# Patient Record
Sex: MALE | URBAN - METROPOLITAN AREA
[De-identification: names, ages, dates, MRNs, and addresses within clinical notes are randomized per-mention and may not be internally consistent; named-entity substitution may affect disease eponyms.]

---

## 2017-01-23 ENCOUNTER — OFFICE VISIT (OUTPATIENT)
Dept: FAMILY MEDICINE | Facility: CLINIC | Age: 27
End: 2017-01-23
Payer: COMMERCIAL

## 2017-01-23 VITALS
WEIGHT: 162 LBS | SYSTOLIC BLOOD PRESSURE: 120 MMHG | DIASTOLIC BLOOD PRESSURE: 58 MMHG | HEART RATE: 55 BPM | BODY MASS INDEX: 23.19 KG/M2 | TEMPERATURE: 97.2 F | HEIGHT: 70 IN

## 2017-01-23 DIAGNOSIS — Z00.00 ROUTINE GENERAL MEDICAL EXAMINATION AT A HEALTH CARE FACILITY: ICD-10-CM

## 2017-01-23 DIAGNOSIS — L29.9 PRURITIC DISORDER: Primary | ICD-10-CM

## 2017-01-23 DIAGNOSIS — B08.1 MOLLUSCUM CONTAGIOSUM: ICD-10-CM

## 2017-01-23 LAB
ALBUMIN SERPL-MCNC: 4.1 G/DL (ref 3.4–5)
ALP SERPL-CCNC: 70 U/L (ref 40–150)
ALT SERPL W P-5'-P-CCNC: 38 U/L (ref 0–70)
ANION GAP SERPL CALCULATED.3IONS-SCNC: 7 MMOL/L (ref 3–14)
AST SERPL W P-5'-P-CCNC: 27 U/L (ref 0–45)
BASOPHILS # BLD AUTO: 0 10E9/L (ref 0–0.2)
BASOPHILS NFR BLD AUTO: 0.3 %
BILIRUB SERPL-MCNC: 0.7 MG/DL (ref 0.2–1.3)
BUN SERPL-MCNC: 15 MG/DL (ref 7–30)
CALCIUM SERPL-MCNC: 9 MG/DL (ref 8.5–10.1)
CHLORIDE SERPL-SCNC: 103 MMOL/L (ref 94–109)
CO2 SERPL-SCNC: 29 MMOL/L (ref 20–32)
CREAT SERPL-MCNC: 0.8 MG/DL (ref 0.66–1.25)
DIFFERENTIAL METHOD BLD: NORMAL
EOSINOPHIL # BLD AUTO: 0.3 10E9/L (ref 0–0.7)
EOSINOPHIL NFR BLD AUTO: 5.3 %
ERYTHROCYTE [DISTWIDTH] IN BLOOD BY AUTOMATED COUNT: 11.9 % (ref 10–15)
GFR SERPL CREATININE-BSD FRML MDRD: NORMAL ML/MIN/1.7M2
GLUCOSE SERPL-MCNC: 88 MG/DL (ref 70–99)
HCT VFR BLD AUTO: 44.2 % (ref 40–53)
HGB BLD-MCNC: 15.2 G/DL (ref 13.3–17.7)
LYMPHOCYTES # BLD AUTO: 2.6 10E9/L (ref 0.8–5.3)
LYMPHOCYTES NFR BLD AUTO: 40.3 %
MCH RBC QN AUTO: 28.7 PG (ref 26.5–33)
MCHC RBC AUTO-ENTMCNC: 34.4 G/DL (ref 31.5–36.5)
MCV RBC AUTO: 83 FL (ref 78–100)
MONOCYTES # BLD AUTO: 0.5 10E9/L (ref 0–1.3)
MONOCYTES NFR BLD AUTO: 7.5 %
NEUTROPHILS # BLD AUTO: 3 10E9/L (ref 1.6–8.3)
NEUTROPHILS NFR BLD AUTO: 46.6 %
PLATELET # BLD AUTO: 169 10E9/L (ref 150–450)
POTASSIUM SERPL-SCNC: 4 MMOL/L (ref 3.4–5.3)
PROT SERPL-MCNC: 7.1 G/DL (ref 6.8–8.8)
RBC # BLD AUTO: 5.3 10E12/L (ref 4.4–5.9)
SODIUM SERPL-SCNC: 139 MMOL/L (ref 133–144)
TSH SERPL DL<=0.005 MIU/L-ACNC: 3.78 MU/L (ref 0.4–4)
WBC # BLD AUTO: 6.4 10E9/L (ref 4–11)

## 2017-01-23 PROCEDURE — 99395 PREV VISIT EST AGE 18-39: CPT | Performed by: FAMILY MEDICINE

## 2017-01-23 PROCEDURE — 80050 GENERAL HEALTH PANEL: CPT | Performed by: FAMILY MEDICINE

## 2017-01-23 PROCEDURE — 99212 OFFICE O/P EST SF 10 MIN: CPT | Mod: 25 | Performed by: FAMILY MEDICINE

## 2017-01-23 PROCEDURE — 36415 COLL VENOUS BLD VENIPUNCTURE: CPT | Performed by: FAMILY MEDICINE

## 2017-01-23 RX ORDER — CETIRIZINE HYDROCHLORIDE 5 MG/1
5 TABLET ORAL DAILY
Qty: 90 TABLET | Refills: 3 | Status: SHIPPED | OUTPATIENT
Start: 2017-01-23

## 2017-01-23 NOTE — Clinical Note
[facility]   [address] [place]  [address]  [phone]                                   January 23, 2017    Parth Tijerina  [address] [facility] [address]        Dear Parth,    Your thyroid and comprehensive tests are all normal   Your basic metabolic panel which includes electrolytes,kidney function is normal and  -Glucose (diabetic screening test) is within normal limits    Liver tests are normal     Follow up as needed.  I have made a referral for you to the dermatologist ;  [facility] [place] [phone]   [email or web address]    Results for orders placed or performed in visit on 01/23/17   CBC with platelets differential   Result Value Ref Range    WBC 6.4 4.0 - 11.0 10e9/L    RBC Count 5.30 4.4 - 5.9 10e12/L    Hemoglobin 15.2 13.3 - 17.7 g/dL    Hematocrit 44.2 40.0 - 53.0 %    MCV 83 78 - 100 fl    MCH 28.7 26.5 - 33.0 pg    MCHC 34.4 31.5 - 36.5 g/dL    RDW 11.9 10.0 - 15.0 %    Platelet Count 169 150 - 450 10e9/L    Diff Method Automated Method     % Neutrophils 46.6 %    % Lymphocytes 40.3 %    % Monocytes 7.5 %    % Eosinophils 5.3 %    % Basophils 0.3 %    Absolute Neutrophil 3.0 1.6 - 8.3 10e9/L    Absolute Lymphocytes 2.6 0.8 - 5.3 10e9/L    Absolute Monocytes 0.5 0.0 - 1.3 10e9/L    Absolute Eosinophils 0.3 0.0 - 0.7 10e9/L    Absolute Basophils 0.0 0.0 - 0.2 10e9/L   TSH with free T4 reflex   Result Value Ref Range    TSH 3.78 0.40 - 4.00 mU/L   Comprehensive metabolic panel   Result Value Ref Range    Sodium 139 133 - 144 mmol/L    Potassium 4.0 3.4 - 5.3 mmol/L    Chloride 103 94 - 109 mmol/L    Carbon Dioxide 29 20 - 32 mmol/L    Anion Gap 7 3 - 14 mmol/L    Glucose 88 70 - 99 mg/dL    Urea Nitrogen 15 7 - 30 mg/dL    Creatinine 0.80 0.66 - 1.25 mg/dL    GFR Estimate >90  Non  GFR Calc   >60 mL/min/1.7m2    GFR Estimate If Black >90   GFR Calc   >60 mL/min/1.7m2     Calcium 9.0 8.5 - 10.1 mg/dL    Bilirubin Total 0.7 0.2 - 1.3 mg/dL    Albumin 4.1 3.4 - 5.0 g/dL    Protein Total 7.1 6.8 - 8.8 g/dL    Alkaline Phosphatase 70 40 - 150 U/L    ALT 38 0 - 70 U/L    AST 27 0 - 45 U/L       If you have any questions please call the clinic at [phone]    Sincerely,    Antonino Perez MD  bmd

## 2017-01-23 NOTE — PROGRESS NOTES
SUBJECTIVE:     CC: Parth Tijerina is an 26 year old male who presents for preventative health visit.     Healthy Habits:    Do you get at least three servings of calcium containing foods daily (dairy, green leafy vegetables, etc.)? yes    Amount of exercise or daily activities, outside of work: 5 day(s) per week    Problems taking medications regularly No    Medication side effects: No    Have you had an eye exam in the past two years? yes    Do you see a dentist twice per year? yes    Do you have sleep apnea, excessive snoring or daytime drowsiness?no        Pt states he has allergies does not know the cause, takes an allergy med that he got from a friend that has helped. States if he doesn't take it within 24 hrs he gets itchy everywhere.  He does not have a rash   162 lbs 0 oz   Weight has been stable   No swollen glands   He thinks it is related to diet     Today's PHQ-2 Score:   PHQ-2 ( 1999 [employer]) 6/9/2015 8/20/2013   Q1: Little interest or pleasure in doing things 0 0   Q2: Feeling down, depressed or hopeless 0 0   PHQ-2 Score 0 0       Abuse: Current or Past(Physical, Sexual or Emotional)- No  Do you feel safe in your environment - Yes    Social History   Substance Use Topics     Smoking status: Never Smoker      Smokeless tobacco: Never Used     Alcohol Use: Yes     The patient does not drink >3 drinks per day nor >7 drinks per week.    Last PSA: No results found for: PSA    Recent Labs   Lab Test  06/30/15   1325   CHOL  146   HDL  46   LDL  91   TRIG  47   CHOLHDLRATIO  3.2       Reviewed orders with patient. Reviewed health maintenance and updated orders accordingly - Yes    All Histories reviewed and updated in Epic.  History reviewed. No pertinent past medical history.   History reviewed. No pertinent past surgical history.    ROS:  C: NEGATIVE for fever, chills, change in weight  I: NEGATIVE for worrisome rashes, moles or lesions  E: NEGATIVE for vision changes or irritation  ENT: NEGATIVE for  ear, mouth and throat problems  R: NEGATIVE for significant cough or SOB  CV: NEGATIVE for chest pain, palpitations or peripheral edema  GI: NEGATIVE for nausea, abdominal pain, heartburn, or change in bowel habits   male: negative for dysuria, hematuria, decreased urinary stream, erectile dysfunction, urethral discharge  M: NEGATIVE for significant arthralgias or myalgia  N: NEGATIVE for weakness, dizziness or paresthesias  P: NEGATIVE for changes in mood or affect    Patient has chronic pruritus   He states it gets worse in the winter   Present for the last 3-4 years   Patient has a negative hiv done through an std screening clinic   He had one episode of a rash that occurred when he came in contact with his dog   He showed me a picture and that appeared to be hives     Problem list, Medication list, Allergies, and Medical/Social/Surgical histories reviewed in [facility] and updated as appropriate.  Labs reviewed in EPIC  BP Readings from Last 3 Encounters:   01/23/17 120/58   06/30/15 115/71   06/09/15 112/63    Wt Readings from Last 3 Encounters:   01/23/17 162 lb (73.483 kg)   06/30/15 158 lb (71.668 kg)   06/09/15 161 lb (73.029 kg)                  Patient Active Problem List   Diagnosis     Vitamin D deficiency     Warts     TB lung, latent     CARDIOVASCULAR SCREENING; LDL GOAL LESS THAN 160     Mechanical low back pain     Low back pain     History reviewed. No pertinent past surgical history.    Social History   Substance Use Topics     Smoking status: Never Smoker      Smokeless tobacco: Never Used     Alcohol Use: Yes     Family History   Problem Relation Age of Onset     HEART DISEASE Mother      Breast Cancer Maternal Grandmother      CANCER Maternal Grandmother      DIABETES Maternal Grandmother      CEREBROVASCULAR DISEASE Maternal Grandmother      Thyroid Disease Maternal Grandmother      Hypertension Paternal Grandmother      CEREBROVASCULAR DISEASE Paternal Grandmother      Hypertension Paternal  "Grandfather      Glaucoma No family hx of      Macular Degeneration No family hx of            Allergies   Allergen Reactions     Lotion [Benzocaine] Rash     Cant  Use lotions on face causes Hives     OBJECTIVE:     /58 mmHg  Pulse 55  Temp(Src) 97.2  F (36.2  C) (Oral)  Ht 5' 9.5\" (1.765 m)  Wt 162 lb (73.483 kg)  BMI 23.59 kg/m2  EXAM:  GENERAL: healthy, alert and no distress  EYES: Eyes grossly normal to inspection, PERRL and conjunctivae and sclerae normal  HENT: ear canals and TM's normal, nose and mouth without ulcers or lesions  NECK: no adenopathy, no asymmetry, masses, or scars and thyroid normal to palpation  RESP: lungs clear to auscultation - no rales, rhonchi or wheezes  CV: regular rate and rhythm, normal S1 S2, no S3 or S4, no murmur, click or rub, no peripheral edema and peripheral pulses strong  ABDOMEN: soft, nontender, no hepatosplenomegaly, no masses and bowel sounds normal  MS: no gross musculoskeletal defects noted, no edema  SKIN: no suspicious lesions or rashes  Healing molluscum noted in the pubic area   NEURO: Normal strength and tone, mentation intact and speech normal  PSYCH: mentation appears normal, affect normal/bright    ASSESSMENT/PLAN:         ICD-10-CM    1. Pruritic disorder L29.9 CBC with platelets differential     TSH with free T4 reflex     Comprehensive metabolic panel     cetirizine (ZYRTEC) 5 MG tablet     CANCELED: XR Chest 2 Views   2. Molluscum contagiosum B08.1    3. Routine general medical examination at a health care facility Z00.00      Patient has taken his cetirizine   An eosinophil count may not be accurate because of this  If all labs are normal, I will refer to a dermatologist   They may want to refer to an allergist, but I would leave that up to them    COUNSELING:  Reviewed preventive health counseling, as reflected in patient instructions       Regular exercise       Healthy diet/nutrition         reports that he has never smoked. He has never used " "smokeless tobacco.    Estimated body mass index is 23.32 kg/(m^2) as calculated from the following:    Height as of 4/8/15: 5' 9\" (1.753 m).    Weight as of 6/30/15: 158 lb (71.668 kg).       Counseling Resources:  ATP IV Guidelines  Pooled Cohorts Equation Calculator  FRAX Risk Assessment  ICSI Preventive Guidelines  Dietary Guidelines for Americans, 2010  USDA's MyPlate  ASA Prophylaxis  Lung CA Screening    Antonino Perez MD  [facility]  "

## 2017-01-23 NOTE — NURSING NOTE
"Chief Complaint   Patient presents with     Physical       Initial /58 mmHg  Pulse 55  Temp(Src) 97.2  F (36.2  C) (Oral)  Ht 5' 9.5\" (1.765 m)  Wt 162 lb (73.483 kg)  BMI 23.59 kg/m2 Estimated body mass index is 23.59 kg/(m^2) as calculated from the following:    Height as of this encounter: 5' 9.5\" (1.765 m).    Weight as of this encounter: 162 lb (73.483 kg).  BP completed using cuff size: nakul Kim MA      "

## 2017-01-23 NOTE — PROGRESS NOTES
Quick Note:    Your complete blood count was normal.   There are no eosinophils present   Other labs are pending  ______

## 2017-01-23 NOTE — MR AVS SNAPSHOT
After Visit Summary   1/23/2017    Parth Tijerina    MRN: [id number]           Patient Information     Date Of Birth          1990        Visit Information        Provider Department      1/23/2017 7:40 AM Antonino Perez MD [facility]        Today's Diagnoses     Pruritic disorder    -  1     Molluscum contagiosum         Routine general medical examination at a health care facility           Care Instructions      Preventive Health Recommendations  Male Ages 18 - 25     Yearly exam:             See your health care provider every year in order to  o   Review health changes.   o   Discuss preventive care.    o   Review your medicines if your doctor has prescribed any.    You should be tested each year for STDs (sexually transmitted diseases).     Talk to your provider about cholesterol testing.      If you are at risk for diabetes, you should have a diabetes test (fasting glucose).    Shots: Get a flu shot each year. Get a tetanus shot every 10 years.     Nutrition:    Eat at least 5 servings of fruits and vegetables daily.     Eat whole-grain bread, whole-wheat pasta and brown rice instead of white grains and rice.     Talk to your provider about calcium and Vitamin D.     Lifestyle    Exercise for at least 150 minutes a week (30 minutes a day, 5 days a week). This will help you control your weight and prevent disease.     Limit alcohol to one drink per day.     No smoking.     Wear sunscreen to prevent skin cancer.     See your dentist every six months for an exam and cleaning.             Follow-ups after your visit        Who to contact     If you have questions or need follow up information about today's clinic visit or your schedule please contact [facility] directly at [phone].  Normal or non-critical lab and imaging results will be communicated to you by MyChart, letter or phone within 4 business days after the clinic has  "received the results. If you do not hear from us within 7 days, please contact the clinic through Urjanet or phone. If you have a critical or abnormal lab result, we will notify you by phone as soon as possible.  Submit refill requests through Urjanet or call your pharmacy and they will forward the refill request to us. Please allow 3 business days for your refill to be completed.          Additional Information About Your Visit        Urjanet Information     Urjanet lets you send messages to your doctor, view your test results, renew your prescriptions, schedule appointments and more. To sign up, go to [email or web address] . Click on \"Log in\" on the left side of the screen, which will take you to the Welcome page. Then click on \"Sign up Now\" on the right side of the page.     You will be asked to enter the access code listed below, as well as some personal information. Please follow the directions to create your username and password.     Your access code is: [email or web address]  Expires: 2017  8:27 AM     Your access code will  in 90 days. If you need help or a new code, please call your [facility] clinic or [phone].        Care EveryWhere ID     This is your Care EveryWhere ID. This could be used by other organizations to access your [facility] medical records  [id number]        Your Vitals Were     Pulse Temperature Height BMI (Body Mass Index)          55 97.2  F (36.2  C) (Oral) 5' 9.5\" (1.765 m) 23.59 kg/m2         Blood Pressure from Last 3 Encounters:   17 120/58   06/30/15 115/71   06/09/15 112/63    Weight from Last 3 Encounters:   17 162 lb (73.483 kg)   06/30/15 158 lb (71.668 kg)   06/09/15 161 lb (73.029 kg)              We Performed the Following     CBC with platelets differential     Comprehensive metabolic panel     TSH with free T4 reflex          Today's Medication Changes          These changes are accurate as of: 17  8:27 AM.  If you have any questions, ask your " nurse or doctor.               Start taking these medicines.        Dose/Directions    cetirizine 5 MG tablet   Commonly known as:  zyrTEC   Used for:  Pruritic disorder   Started by:  Antonino Perez MD        Dose:  5 mg   Take 1 tablet (5 mg) by mouth daily   Quantity:  90 tablet   Refills:  3            Where to get your medicines      Some of these will need a paper prescription and others can be bought over the counter.  Ask your nurse if you have questions.     Bring a paper prescription for each of these medications    - cetirizine 5 MG tablet             Primary Care Provider Office Phone # Fax #    Antonino Perez [phone]       [facility] [address]  [facility] [address]        Thank you!     Thank you for choosing [facility]  for your care. Our goal is always to provide you with excellent care. Hearing back from our patients is one way we can continue to improve our services. Please take a few minutes to complete the written survey that you may receive in the mail after your visit with us. Thank you!             Your Updated Medication List - Protect others around you: Learn how to safely use, store and throw away your medicines at www.disposemymeds.org.          This list is accurate as of: 1/23/17  8:27 AM.  Always use your most recent med list.                   Brand Name Dispense Instructions for use    cetirizine 5 MG tablet    zyrTEC    90 tablet    Take 1 tablet (5 mg) by mouth daily       finasteride 1 MG tablet    PROPECIA    30 tablet    Take 1 tablet (1 mg) by mouth daily       ibuprofen 200 MG capsule      1 CAPSULE EVERY 4 TO 6 HOURS AS NEEDED       isoniazid 300 MG tablet    NYDRAZID    90 tablet    Take 1 tablet (300 mg) by mouth daily       VANE MULTI MEN PO      Take  by mouth.       TYLENOL CAPS 500 MG OR      1 CAPSULE EVERY 4 HOURS AS NEEDED

## 2017-01-23 NOTE — PROGRESS NOTES
Quick Note:    Your thyroid and comprehensive tests are all normal   Your basic metabolic panel which includes electrolytes,kidney function is normal and -Glucose (diabetic screening test) is within normal limits    Liver tests are normal     Follow up as needed.  I have made a referral for you to the dermatologist ; [facility] [phone] [email or web address]          ______